# Patient Record
Sex: FEMALE | Race: ASIAN
[De-identification: names, ages, dates, MRNs, and addresses within clinical notes are randomized per-mention and may not be internally consistent; named-entity substitution may affect disease eponyms.]

---
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
## 2018-01-17 ENCOUNTER — APPOINTMENT (OUTPATIENT)
Dept: OBGYN | Facility: CLINIC | Age: 18
End: 2018-01-17
Payer: COMMERCIAL

## 2018-01-17 PROCEDURE — 90471 IMMUNIZATION ADMIN: CPT

## 2018-01-17 PROCEDURE — 99213 OFFICE O/P EST LOW 20 MIN: CPT | Mod: 25

## 2018-01-17 PROCEDURE — 90649 4VHPV VACCINE 3 DOSE IM: CPT

## 2018-10-01 ENCOUNTER — APPOINTMENT (OUTPATIENT)
Dept: OBGYN | Facility: CLINIC | Age: 18
End: 2018-10-01
Payer: COMMERCIAL

## 2018-10-01 PROCEDURE — 99395 PREV VISIT EST AGE 18-39: CPT | Mod: 25

## 2018-10-01 PROCEDURE — 90651 9VHPV VACCINE 2/3 DOSE IM: CPT

## 2018-10-01 PROCEDURE — 90471 IMMUNIZATION ADMIN: CPT

## 2019-01-18 ENCOUNTER — APPOINTMENT (OUTPATIENT)
Dept: OBGYN | Facility: CLINIC | Age: 19
End: 2019-01-18
Payer: COMMERCIAL

## 2019-01-18 PROCEDURE — 99213 OFFICE O/P EST LOW 20 MIN: CPT

## 2019-10-11 ENCOUNTER — APPOINTMENT (OUTPATIENT)
Dept: OBGYN | Facility: CLINIC | Age: 19
End: 2019-10-11

## 2019-10-25 ENCOUNTER — APPOINTMENT (OUTPATIENT)
Dept: OBGYN | Facility: CLINIC | Age: 19
End: 2019-10-25
Payer: COMMERCIAL

## 2019-10-25 PROCEDURE — 99395 PREV VISIT EST AGE 18-39: CPT

## 2020-05-19 ENCOUNTER — FORM ENCOUNTER (OUTPATIENT)
Age: 20
End: 2020-05-19

## 2020-12-18 ENCOUNTER — NON-APPOINTMENT (OUTPATIENT)
Age: 20
End: 2020-12-18

## 2020-12-22 ENCOUNTER — APPOINTMENT (OUTPATIENT)
Dept: OTOLARYNGOLOGY | Facility: CLINIC | Age: 20
End: 2020-12-22
Payer: COMMERCIAL

## 2020-12-22 ENCOUNTER — NON-APPOINTMENT (OUTPATIENT)
Age: 20
End: 2020-12-22

## 2020-12-22 VITALS — TEMPERATURE: 98.7 F | BODY MASS INDEX: 17.07 KG/M2 | WEIGHT: 100 LBS | HEIGHT: 64 IN

## 2020-12-22 PROCEDURE — 99072 ADDL SUPL MATRL&STAF TM PHE: CPT

## 2020-12-22 PROCEDURE — 99203 OFFICE O/P NEW LOW 30 MIN: CPT | Mod: 25

## 2020-12-22 PROCEDURE — 31231 NASAL ENDOSCOPY DX: CPT

## 2020-12-22 RX ORDER — ESCITALOPRAM OXALATE 20 MG/1
TABLET ORAL
Refills: 0 | Status: ACTIVE | COMMUNITY

## 2020-12-22 RX ORDER — SPIRONOLACTONE 25 MG/1
25 TABLET ORAL
Qty: 60 | Refills: 0 | Status: ACTIVE | COMMUNITY
Start: 2020-10-12

## 2020-12-22 NOTE — REVIEW OF SYSTEMS
[Post Nasal Drip] : post nasal drip [Nasal Congestion] : nasal congestion [Nose Bleeds] : nose bleeds [Throat Clearing] : throat clearing [Negative] : Heme/Lymph [Patient Intake Form Reviewed] : Patient intake form was reviewed

## 2020-12-22 NOTE — HISTORY OF PRESENT ILLNESS
[de-identified] : CARINE STANLEY is a 20 year woman with a history of 3 years of recurrent epistaxis. Her last bleed was 10 days ago from the right side. She usually uses ice to stop it.

## 2020-12-22 NOTE — ASSESSMENT
[FreeTextEntry1] : CARINE STANLEY feesl better after cerumen removal. She is using humidifier. I suggest she also use ointment on caudal septum. RTC if continued bleeding for possible cautery.

## 2020-12-22 NOTE — CONSULT LETTER
[Dear  ___] : Dear  [unfilled], [Consult Letter:] : I had the pleasure of evaluating your patient, [unfilled]. [Please see my note below.] : Please see my note below. [Consult Closing:] : Thank you very much for allowing me to participate in the care of this patient.  If you have any questions, please do not hesitate to contact me. [Sincerely,] : Sincerely, [FreeTextEntry3] : Zaki Corado MD\par

## 2021-02-25 ENCOUNTER — APPOINTMENT (OUTPATIENT)
Dept: OTOLARYNGOLOGY | Facility: CLINIC | Age: 21
End: 2021-02-25
Payer: COMMERCIAL

## 2021-02-25 VITALS — TEMPERATURE: 98.7 F | HEIGHT: 64 IN | BODY MASS INDEX: 17.07 KG/M2 | WEIGHT: 100 LBS

## 2021-02-25 PROCEDURE — 30901 CONTROL OF NOSEBLEED: CPT

## 2021-02-25 PROCEDURE — 99072 ADDL SUPL MATRL&STAF TM PHE: CPT

## 2021-02-25 PROCEDURE — 99214 OFFICE O/P EST MOD 30 MIN: CPT | Mod: 25

## 2021-02-25 NOTE — HISTORY OF PRESENT ILLNESS
[de-identified] : CARINE STANLEY is a 21 year woman with a history of epistaxis. She has had several episodes of left epistaxis  yesterday.

## 2021-03-07 ENCOUNTER — FORM ENCOUNTER (OUTPATIENT)
Age: 21
End: 2021-03-07

## 2021-03-08 ENCOUNTER — APPOINTMENT (OUTPATIENT)
Dept: OBGYN | Facility: CLINIC | Age: 21
End: 2021-03-08
Payer: COMMERCIAL

## 2021-03-08 PROCEDURE — 99395 PREV VISIT EST AGE 18-39: CPT

## 2021-03-08 PROCEDURE — 99072 ADDL SUPL MATRL&STAF TM PHE: CPT

## 2021-03-09 ENCOUNTER — FORM ENCOUNTER (OUTPATIENT)
Age: 21
End: 2021-03-09

## 2021-07-15 ENCOUNTER — APPOINTMENT (OUTPATIENT)
Dept: OTOLARYNGOLOGY | Facility: CLINIC | Age: 21
End: 2021-07-15
Payer: COMMERCIAL

## 2021-07-15 VITALS — HEIGHT: 64 IN | BODY MASS INDEX: 17.07 KG/M2 | TEMPERATURE: 96.8 F | WEIGHT: 100 LBS

## 2021-07-15 PROCEDURE — 99214 OFFICE O/P EST MOD 30 MIN: CPT | Mod: 25

## 2021-07-15 PROCEDURE — 69210 REMOVE IMPACTED EAR WAX UNI: CPT

## 2021-07-15 NOTE — HISTORY OF PRESENT ILLNESS
[de-identified] : CARINE STANLEY is a 21 year woman with a history of epistaxis and cerumen impaction. She hasn't had recent bleeding.

## 2021-07-15 NOTE — ASSESSMENT
[FreeTextEntry1] : CARINE LIZETH\par  felt better after cerumen removal. Her septum does not have significant crusting. RTC 6 months.

## 2022-01-28 ENCOUNTER — APPOINTMENT (OUTPATIENT)
Dept: OTOLARYNGOLOGY | Facility: CLINIC | Age: 22
End: 2022-01-28

## 2022-03-03 ENCOUNTER — APPOINTMENT (OUTPATIENT)
Dept: OTOLARYNGOLOGY | Facility: CLINIC | Age: 22
End: 2022-03-03
Payer: COMMERCIAL

## 2022-03-03 VITALS — TEMPERATURE: 97.2 F | BODY MASS INDEX: 17.68 KG/M2 | WEIGHT: 110 LBS | HEIGHT: 66 IN

## 2022-03-03 PROCEDURE — 69210 REMOVE IMPACTED EAR WAX UNI: CPT

## 2022-03-03 PROCEDURE — 99213 OFFICE O/P EST LOW 20 MIN: CPT | Mod: 25

## 2022-03-03 NOTE — ASSESSMENT
[FreeTextEntry1] : CARINE STANLEY felt better after cerumen removal. she will use ointment tin her nose.

## 2022-03-03 NOTE — HISTORY OF PRESENT ILLNESS
[de-identified] : CARINE STANLEY is a 22 year woman with a history of cerumen impaction and epistaxis. The epistaxis seems to be related to Accutane. She recently stopped it and will try it again at a lower dose.

## 2022-03-17 ENCOUNTER — RX RENEWAL (OUTPATIENT)
Age: 22
End: 2022-03-17

## 2022-03-31 ENCOUNTER — NON-APPOINTMENT (OUTPATIENT)
Age: 22
End: 2022-03-31

## 2022-04-21 ENCOUNTER — APPOINTMENT (OUTPATIENT)
Dept: OBGYN | Facility: CLINIC | Age: 22
End: 2022-04-21
Payer: COMMERCIAL

## 2022-04-21 VITALS
RESPIRATION RATE: 15 BRPM | HEART RATE: 78 BPM | SYSTOLIC BLOOD PRESSURE: 109 MMHG | WEIGHT: 110 LBS | DIASTOLIC BLOOD PRESSURE: 70 MMHG | OXYGEN SATURATION: 98 % | HEIGHT: 66 IN | BODY MASS INDEX: 17.68 KG/M2

## 2022-04-21 DIAGNOSIS — Z01.419 ENCOUNTER FOR GYNECOLOGICAL EXAMINATION (GENERAL) (ROUTINE) W/OUT ABNORMAL FINDINGS: ICD-10-CM

## 2022-04-21 DIAGNOSIS — Z30.41 ENCOUNTER FOR SURVEILLANCE OF CONTRACEPTIVE PILLS: ICD-10-CM

## 2022-04-21 PROCEDURE — 99395 PREV VISIT EST AGE 18-39: CPT

## 2022-04-21 NOTE — HISTORY OF PRESENT ILLNESS
[TextBox_4] : 21yo G0 presents for routine gyn exam. Not yet sexually active.  does not use tampons.  Declines sti screen. Normal period/cycle on luis.  No complaints. \par Working in computers.\par \par Info. from prior EMR:\par Demographics:  Race:  Ethnicity: Not  or  Native Language: English Occupation: Student \par : 0  Para:  0 0 0 0\par OB History: No significant OB history. \par \par GYN History: No significant GYN history. Single \par PMH\par  No significant past medical history. \par Surgical History: Strabismus \par Allergies: nkda\par Current Medications: Prescribed/Suppliments/OTC OCP, ORAL MED FOR SKIN \par Medications Verified Medications Verified \par Fam HX comments: pt adopted \par \par DVT \par  Personal history of blood clots/DVT/PE: no  \par  Personal history of conditions causing increased risk of blood clots/DVT/PE: no  \par

## 2022-04-21 NOTE — PLAN
[FreeTextEntry1] : HCM\par -SBE\par -virginal - not able to tolerate speculum/bimanual exam\par -refilled ocp\par \par RTO 1 year\par

## 2022-04-21 NOTE — PHYSICAL EXAM
[Chaperone Declined] : Patient declined chaperone [Appropriately responsive] : appropriately responsive [Alert] : alert [No Acute Distress] : no acute distress [No Lymphadenopathy] : no lymphadenopathy [Soft] : soft [Non-tender] : non-tender [Non-distended] : non-distended [No Lesions] : no lesions [No Mass] : no mass [Oriented x3] : oriented x3 [Examination Of The Breasts] : a normal appearance [No Masses] : no breast masses were palpable [Labia Majora] : normal [Labia Minora] : normal [Normal] : normal [FreeTextEntry5] : Resp. rate wnl, color pink, no SOB [FreeTextEntry4] : pt. did not tollerate speculum or pelvic exam [FreeTextEntry8] : Pt. not able to tolerate exam, virginal

## 2022-05-05 ENCOUNTER — NON-APPOINTMENT (OUTPATIENT)
Age: 22
End: 2022-05-05

## 2022-05-12 ENCOUNTER — APPOINTMENT (OUTPATIENT)
Dept: OTOLARYNGOLOGY | Facility: CLINIC | Age: 22
End: 2022-05-12
Payer: COMMERCIAL

## 2022-05-12 VITALS — TEMPERATURE: 97.8 F | HEIGHT: 66 IN | WEIGHT: 110 LBS | BODY MASS INDEX: 17.68 KG/M2

## 2022-05-12 PROCEDURE — 99213 OFFICE O/P EST LOW 20 MIN: CPT | Mod: 25

## 2022-05-12 PROCEDURE — 69210 REMOVE IMPACTED EAR WAX UNI: CPT

## 2022-05-12 NOTE — REASON FOR VISIT
[Subsequent Evaluation] : a subsequent evaluation for [FreeTextEntry2] : left ear vibration feeling

## 2022-05-12 NOTE — HISTORY OF PRESENT ILLNESS
[de-identified] : CARINE STANLEY is a 22 year woman with a history of cerumen impaction complaining of several days of left ear discomfort/vibration. She felt it for a few days and it has resolved.

## 2022-05-12 NOTE — ASSESSMENT
[FreeTextEntry1] : CARINE STANLEY had several days of intermittent vibration AS. Now resolved. ? ME muscle spasm. RTC 3-4 months.

## 2022-05-24 ENCOUNTER — NON-APPOINTMENT (OUTPATIENT)
Age: 22
End: 2022-05-24

## 2022-09-04 ENCOUNTER — NON-APPOINTMENT (OUTPATIENT)
Age: 22
End: 2022-09-04

## 2022-09-08 ENCOUNTER — APPOINTMENT (OUTPATIENT)
Dept: OTOLARYNGOLOGY | Facility: CLINIC | Age: 22
End: 2022-09-08

## 2022-09-09 ENCOUNTER — APPOINTMENT (OUTPATIENT)
Dept: OTOLARYNGOLOGY | Facility: CLINIC | Age: 22
End: 2022-09-09

## 2022-09-09 VITALS — TEMPERATURE: 97.4 F | BODY MASS INDEX: 17.68 KG/M2 | WEIGHT: 110 LBS | HEIGHT: 66 IN

## 2022-09-09 PROCEDURE — 69210 REMOVE IMPACTED EAR WAX UNI: CPT

## 2022-09-09 PROCEDURE — 99213 OFFICE O/P EST LOW 20 MIN: CPT | Mod: 25

## 2022-09-09 PROCEDURE — 31231 NASAL ENDOSCOPY DX: CPT

## 2022-09-09 NOTE — HISTORY OF PRESENT ILLNESS
[de-identified] : CARINE STANLEY is a 22 year woman with a history of cerumen impaction and epistaxis. She had 2  right sided nosebleeds about one week ago one after the other. She has had no bleeding since.

## 2022-09-09 NOTE — ASSESSMENT
[FreeTextEntry1] : CARINE STANLEY had right anterior nosebleed last one week ago. I suggested applying ointment to caudal septum. I showed her how to pack her nose if it bleeds. If so she will come in. RTC 6 months.

## 2023-01-15 ENCOUNTER — NON-APPOINTMENT (OUTPATIENT)
Age: 23
End: 2023-01-15

## 2023-02-03 ENCOUNTER — NON-APPOINTMENT (OUTPATIENT)
Age: 23
End: 2023-02-03

## 2023-03-07 ENCOUNTER — APPOINTMENT (OUTPATIENT)
Dept: OTOLARYNGOLOGY | Facility: CLINIC | Age: 23
End: 2023-03-07
Payer: COMMERCIAL

## 2023-03-07 VITALS — BODY MASS INDEX: 17.68 KG/M2 | HEIGHT: 66 IN | WEIGHT: 110 LBS

## 2023-03-07 PROCEDURE — 69210 REMOVE IMPACTED EAR WAX UNI: CPT

## 2023-03-07 PROCEDURE — 99213 OFFICE O/P EST LOW 20 MIN: CPT | Mod: 25

## 2023-03-07 RX ORDER — DROSPIRENONE AND ETHINYL ESTRADIOL 0.02-3(28)
3-0.02 KIT ORAL DAILY
Qty: 1 | Refills: 0 | Status: COMPLETED | COMMUNITY
Start: 2022-03-17 | End: 2023-03-07

## 2023-03-07 RX ORDER — SPIRONOLACTONE 50 MG/1
TABLET ORAL
Refills: 0 | Status: COMPLETED | COMMUNITY
End: 2023-03-07

## 2023-03-07 RX ORDER — ESCITALOPRAM OXALATE 5 MG/1
5 TABLET ORAL
Qty: 90 | Refills: 0 | Status: COMPLETED | COMMUNITY
Start: 2020-10-24 | End: 2023-03-07

## 2023-03-07 RX ORDER — LEVONORGESTREL 0.75 MG/1
0.75 TABLET ORAL
Refills: 0 | Status: COMPLETED | COMMUNITY
End: 2023-03-07

## 2023-03-07 NOTE — HISTORY OF PRESENT ILLNESS
[de-identified] : CARINE STANLEY is a 23 year woman with a history of cerumen impaction. Her ears are clogged.

## 2023-03-21 ENCOUNTER — APPOINTMENT (OUTPATIENT)
Dept: OPHTHALMOLOGY | Facility: CLINIC | Age: 23
End: 2023-03-21
Payer: COMMERCIAL

## 2023-03-21 ENCOUNTER — NON-APPOINTMENT (OUTPATIENT)
Age: 23
End: 2023-03-21

## 2023-03-21 PROCEDURE — 92250 FUNDUS PHOTOGRAPHY W/I&R: CPT

## 2023-03-21 PROCEDURE — 92004 COMPRE OPH EXAM NEW PT 1/>: CPT

## 2023-05-10 ENCOUNTER — APPOINTMENT (OUTPATIENT)
Dept: OBGYN | Facility: CLINIC | Age: 23
End: 2023-05-10

## 2023-07-25 ENCOUNTER — APPOINTMENT (OUTPATIENT)
Dept: OPHTHALMOLOGY | Facility: CLINIC | Age: 23
End: 2023-07-25

## 2023-08-29 ENCOUNTER — APPOINTMENT (OUTPATIENT)
Dept: OBGYN | Facility: CLINIC | Age: 23
End: 2023-08-29
Payer: COMMERCIAL

## 2023-08-29 VITALS
SYSTOLIC BLOOD PRESSURE: 97 MMHG | WEIGHT: 110 LBS | DIASTOLIC BLOOD PRESSURE: 61 MMHG | HEIGHT: 66 IN | BODY MASS INDEX: 17.68 KG/M2

## 2023-08-29 PROCEDURE — 99395 PREV VISIT EST AGE 18-39: CPT

## 2023-08-29 NOTE — HISTORY OF PRESENT ILLNESS
[FreeTextEntry1] : 23 year old female presents for an annual. Pt is doing well with no complaints. She is currently on Justina. Not sexually active.  SHx: In online Graduate school.

## 2023-08-29 NOTE — END OF VISIT
[FreeTextEntry3] : I, Rylie Gonzalez, acted as a scribe on behalf of Dr. Abi Randall D.O. on 08/29/2023.  All medical entries made by the scribe were at my, Dr. Abi Randall D.O, direction and personally dictated by me on 08/29/2023. I have reviewed the chart and agree that the record accurately reflects my personal performance of the history, physical exam, assessment and plan. I have also personally directed, reviewed, and agreed with the chart.

## 2023-09-12 ENCOUNTER — APPOINTMENT (OUTPATIENT)
Dept: OTOLARYNGOLOGY | Facility: CLINIC | Age: 23
End: 2023-09-12
Payer: COMMERCIAL

## 2023-09-12 VITALS — WEIGHT: 110 LBS | BODY MASS INDEX: 17.68 KG/M2 | HEIGHT: 66 IN

## 2023-09-12 DIAGNOSIS — H92.02 OTALGIA, LEFT EAR: ICD-10-CM

## 2023-09-12 PROCEDURE — 69210 REMOVE IMPACTED EAR WAX UNI: CPT | Mod: LT

## 2023-09-12 PROCEDURE — 99213 OFFICE O/P EST LOW 20 MIN: CPT | Mod: 25

## 2024-02-12 RX ORDER — DROSPIRENONE AND ETHINYL ESTRADIOL 0.02-3(28)
3-0.02 KIT ORAL
Qty: 3 | Refills: 2 | Status: ACTIVE | COMMUNITY
Start: 2022-04-21 | End: 1900-01-01

## 2024-03-15 ENCOUNTER — APPOINTMENT (OUTPATIENT)
Dept: OTOLARYNGOLOGY | Facility: CLINIC | Age: 24
End: 2024-03-15
Payer: COMMERCIAL

## 2024-03-15 DIAGNOSIS — H61.23 IMPACTED CERUMEN, BILATERAL: ICD-10-CM

## 2024-03-15 DIAGNOSIS — R04.0 EPISTAXIS: ICD-10-CM

## 2024-03-15 PROCEDURE — 31231 NASAL ENDOSCOPY DX: CPT

## 2024-03-15 PROCEDURE — 99213 OFFICE O/P EST LOW 20 MIN: CPT | Mod: 25

## 2024-03-15 RX ORDER — SODIUM FLUORIDE AND POTASSIUM NITRATE 5.8; 57.5 MG/ML; MG/ML
1.1-5 GEL, DENTIFRICE DENTAL
Qty: 100 | Refills: 0 | Status: DISCONTINUED | COMMUNITY
Start: 2020-11-25 | End: 2024-03-15

## 2024-03-15 RX ORDER — LEVONORGESTREL AND ETHINYL ESTRADIOL 0.1-0.02MG
0.1-2 KIT ORAL
Qty: 84 | Refills: 0 | Status: DISCONTINUED | COMMUNITY
Start: 2020-05-20 | End: 2024-03-15

## 2024-03-15 RX ORDER — TRETINOIN 1 MG/G
0.1 CREAM TOPICAL
Qty: 135 | Refills: 0 | Status: DISCONTINUED | COMMUNITY
Start: 2020-10-12 | End: 2024-03-15

## 2024-03-15 RX ORDER — CHLORHEXIDINE GLUCONATE, 0.12% ORAL RINSE 1.2 MG/ML
0.12 SOLUTION DENTAL
Qty: 473 | Refills: 0 | Status: DISCONTINUED | COMMUNITY
Start: 2020-08-11 | End: 2024-03-15

## 2024-03-15 NOTE — HISTORY OF PRESENT ILLNESS
[de-identified] : CARINE STANLEY  is a 24 year woman with a history of cerumen impaction. She had 2 episodes of left epitaxies over 2 weeks. last episode was several days ago.

## 2024-03-15 NOTE — PHYSICAL EXAM
[TextEntry] : PHYSICAL EXAM  General: The patient was alert and oriented and in no distress. Voice was normal.  Neurologic: Cranial Nerves II-XII intact PERRLA There was no significant nystagmus or disconjugate gaze noted.  EOM's: Normal  Face: The patient had no facial asymmetry or mass. The skin was unremarkable.  Ears: External ears were normal without deformity. Ear canals were normal. Tympanic membranes were intact and normal. No perforation or effusion I removed impacted cerumen from both ears under the microscope with curet, forceps   Nose:  The external nose had no significant deformity.  There was no facial tenderness.  On anterior rhinoscopy, the nasal mucosa was normal.  The anterior septum was normal. There were no visualized polyps purulence  or masses.  Oral cavity: The oral mucosa was normal. The oral and base of tongue were clear and without mass. The gingival and buccal mucosa were moist and without lesions. The palate moved well. There was no cleft palate. There appeared to be good salivary flow.   There was no pus, erythema or mass in the oral cavity/oropharynx.  Neck:  The neck was symmetrical. The parotid and submandibular glands were normal without masses. The trachea was midline and there was no unusual crepitus. Thyroid was smooth and nontender and no masses were palpated. Cervical adenopathy- none.  Procedure: Nasal Endoscopy  Diagnosis: Chronic sinusitis  Dr. Zaki Corado  Anesthesia: none Procedure: The fiberoptic endoscope was introduced into the right nostril and passed along the floor of the nose and then  along the middle meatus. The same procedure was carried out  on the left side.  Findings:   Septum: mildly deviated bilaterally        crusting on both sides of septum. Right:    Middle Turbinate: normal Middle meatus congested no polyps or pus  Superior meatus:normal SER:normal Inferior Turbinate: normal  Left:      Middle Turbinate: normal Middle meatus congested no polyps or pus Superior meatus:normal SER:normal Inferior Turbinate: normal

## 2024-05-30 ENCOUNTER — APPOINTMENT (OUTPATIENT)
Dept: OBGYN | Facility: CLINIC | Age: 24
End: 2024-05-30
Payer: COMMERCIAL

## 2024-05-30 VITALS
SYSTOLIC BLOOD PRESSURE: 94 MMHG | HEIGHT: 66 IN | WEIGHT: 121 LBS | DIASTOLIC BLOOD PRESSURE: 60 MMHG | BODY MASS INDEX: 19.44 KG/M2 | HEART RATE: 71 BPM

## 2024-05-30 DIAGNOSIS — N94.6 DYSMENORRHEA, UNSPECIFIED: ICD-10-CM

## 2024-05-30 PROCEDURE — 99213 OFFICE O/P EST LOW 20 MIN: CPT

## 2024-05-30 RX ORDER — NORETHINDRONE ACETATE AND ETHINYL ESTRADIOL AND FERROUS FUMARATE 1MG-20(21)
1-20 KIT ORAL
Qty: 3 | Refills: 1 | Status: ACTIVE | COMMUNITY
Start: 2024-05-30 | End: 1900-01-01

## 2024-05-30 RX ORDER — NAPROXEN SODIUM 550 MG/1
550 TABLET ORAL EVERY 8 HOURS
Qty: 90 | Refills: 1 | Status: ACTIVE | COMMUNITY
Start: 2024-05-30 | End: 1900-01-01

## 2024-05-30 NOTE — END OF VISIT
[FreeTextEntry3] : I, Rylie Gonzalez, acted as a scribe on behalf of Dr. Abi Randall D.O. on 05/30/2024.  All medical entries made by the scribe were at my, Dr. Abi Randall D.O, direction and personally dictated by me on 05/30/2024. I have reviewed the chart and agree that the record accurately reflects my personal performance of the history, physical exam, assessment and plan. I have also personally directed, reviewed, and agreed with the chart.

## 2024-05-30 NOTE — PLAN
[FreeTextEntry1] : 24 year old female presents to discuss BC options  -Rx Junel -Rx Anaprox DS  RTO for annual

## 2024-05-30 NOTE — HISTORY OF PRESENT ILLNESS
[FreeTextEntry1] : 24 year old female presents to discuss BC options. C/o of still having painful periods despite being on Justina.

## 2024-08-08 ENCOUNTER — APPOINTMENT (OUTPATIENT)
Dept: OTOLARYNGOLOGY | Facility: CLINIC | Age: 24
End: 2024-08-08

## 2024-08-08 PROCEDURE — 31575 DIAGNOSTIC LARYNGOSCOPY: CPT

## 2024-08-08 PROCEDURE — 69210 REMOVE IMPACTED EAR WAX UNI: CPT | Mod: 59

## 2024-08-08 PROCEDURE — 99213 OFFICE O/P EST LOW 20 MIN: CPT | Mod: 25

## 2024-08-08 NOTE — REASON FOR VISIT
[Subsequent Evaluation] : a subsequent evaluation for [FreeTextEntry2] : impacted cerumen and cough

## 2024-09-05 ENCOUNTER — APPOINTMENT (OUTPATIENT)
Dept: OTOLARYNGOLOGY | Facility: CLINIC | Age: 24
End: 2024-09-05
Payer: COMMERCIAL

## 2024-09-05 VITALS — WEIGHT: 121 LBS | HEIGHT: 66 IN | BODY MASS INDEX: 19.44 KG/M2

## 2024-09-05 DIAGNOSIS — K21.9 GASTRO-ESOPHAGEAL REFLUX DISEASE W/OUT ESOPHAGITIS: ICD-10-CM

## 2024-09-05 DIAGNOSIS — R05.9 COUGH, UNSPECIFIED: ICD-10-CM

## 2024-09-05 PROCEDURE — 99213 OFFICE O/P EST LOW 20 MIN: CPT | Mod: 25

## 2024-09-05 PROCEDURE — 31575 DIAGNOSTIC LARYNGOSCOPY: CPT

## 2024-09-05 RX ORDER — FAMOTIDINE 20 MG/1
20 TABLET, FILM COATED ORAL
Qty: 30 | Refills: 0 | Status: ACTIVE | COMMUNITY
Start: 2024-09-05 | End: 1900-01-01

## 2024-09-05 NOTE — ASSESSMENT
[FreeTextEntry1] : CARINE STANLEY is feeling much better re cough. She will reduce Famotidine to 20mg for 2-3 weeks.

## 2024-09-05 NOTE — HISTORY OF PRESENT ILLNESS
[de-identified] :  CARINE STANLEY is a 24 year woman with a history of recent URI followed by cough from throat. She antibiotic, prednisone and CXR normal) She has been on LPR regimen Famotine 40mg, reflux diet and alkaline water. She feels back to normal without cough.

## 2024-09-05 NOTE — PHYSICAL EXAM
[TextEntry] : PHYSICAL EXAM  General: The patient was alert and oriented and in no distress. Voice was normal.    EOM's: Normal  Face: The patient had no facial asymmetry or mass. The skin was unremarkable.  Ears: External ears were normal without deformity. Ear canals were normal. Tympanic membranes were intact and normal. No perforation or effusion  Nose:  The external nose had no significant deformity.  There was no facial tenderness.  On anterior rhinoscopy, the nasal mucosa was normal.  The anterior septum was normal. There were no visualized polyps purulence  or masses.  Oral cavity: The oral mucosa was normal. The oral and base of tongue were clear and without mass. The gingival and buccal mucosa were moist and without lesions. The palate moved well. There was no cleft palate. There appeared to be good salivary flow.   There was no pus, erythema or mass in the oral cavity/oropharynx.  Neck:  The neck was symmetrical. The parotid and submandibular glands were normal without masses. The trachea was midline and there was no unusual crepitus. Thyroid was smooth and nontender and no masses were palpated. Cervical adenopathy- none.  Procedure: Fiberoptic Nasolaryngoscopy Dx: Dysphagia, LPRD, Hoarseness Dr. Zaki Corado Anesthetic: Lidocaine and oxymetazoline topical   Findings: The flexible scope was easily passed via the nose. The larynx was grossly normal. The epiglottis was normal. The hypopharynx and base of tongue were normal. The vallecula was normal. The vocal folds were grossly normal and moved normally. There are  mild changes of laryngopharyngeal reflux  (LPR)  manifest by mild ventricular swelling,  posterior commissure thickening.   No lesions were noted.

## 2025-03-04 ENCOUNTER — NON-APPOINTMENT (OUTPATIENT)
Age: 25
End: 2025-03-04

## 2025-03-04 ENCOUNTER — APPOINTMENT (OUTPATIENT)
Dept: OTOLARYNGOLOGY | Facility: CLINIC | Age: 25
End: 2025-03-04
Payer: COMMERCIAL

## 2025-03-04 VITALS — BODY MASS INDEX: 18.48 KG/M2 | HEIGHT: 66 IN | WEIGHT: 115 LBS

## 2025-03-04 DIAGNOSIS — H61.23 IMPACTED CERUMEN, BILATERAL: ICD-10-CM

## 2025-03-04 DIAGNOSIS — K21.9 GASTRO-ESOPHAGEAL REFLUX DISEASE W/OUT ESOPHAGITIS: ICD-10-CM

## 2025-03-04 DIAGNOSIS — H60.543 ACUTE ECZEMATOID OTITIS EXTERNA, BILATERAL: ICD-10-CM

## 2025-03-04 PROCEDURE — 99213 OFFICE O/P EST LOW 20 MIN: CPT | Mod: 25

## 2025-03-04 PROCEDURE — 69210 REMOVE IMPACTED EAR WAX UNI: CPT

## 2025-07-11 ENCOUNTER — APPOINTMENT (OUTPATIENT)
Dept: OBGYN | Facility: CLINIC | Age: 25
End: 2025-07-11
Payer: COMMERCIAL

## 2025-07-11 VITALS
WEIGHT: 115 LBS | DIASTOLIC BLOOD PRESSURE: 67 MMHG | HEART RATE: 73 BPM | HEIGHT: 66 IN | BODY MASS INDEX: 18.48 KG/M2 | SYSTOLIC BLOOD PRESSURE: 99 MMHG

## 2025-07-11 PROBLEM — N92.6 IRREGULAR MENSTRUAL BLEEDING: Status: ACTIVE | Noted: 2025-07-11

## 2025-07-11 PROCEDURE — 99213 OFFICE O/P EST LOW 20 MIN: CPT

## 2025-07-11 RX ORDER — LEVONORGESTREL AND ETHINYL ESTRADIOL 0.1-0.02MG
0.1-2 KIT ORAL DAILY
Qty: 3 | Refills: 2 | Status: ACTIVE | COMMUNITY
Start: 2025-07-11 | End: 1900-01-01

## 2025-09-03 ENCOUNTER — TRANSCRIPTION ENCOUNTER (OUTPATIENT)
Age: 25
End: 2025-09-03

## 2025-09-04 ENCOUNTER — TRANSCRIPTION ENCOUNTER (OUTPATIENT)
Age: 25
End: 2025-09-04

## 2025-09-09 ENCOUNTER — APPOINTMENT (OUTPATIENT)
Dept: OTOLARYNGOLOGY | Facility: CLINIC | Age: 25
End: 2025-09-09
Payer: COMMERCIAL

## 2025-09-09 DIAGNOSIS — H60.543 ACUTE ECZEMATOID OTITIS EXTERNA, BILATERAL: ICD-10-CM

## 2025-09-09 DIAGNOSIS — J30.2 OTHER SEASONAL ALLERGIC RHINITIS: ICD-10-CM

## 2025-09-09 DIAGNOSIS — H61.23 IMPACTED CERUMEN, BILATERAL: ICD-10-CM

## 2025-09-09 DIAGNOSIS — K21.9 GASTRO-ESOPHAGEAL REFLUX DISEASE W/OUT ESOPHAGITIS: ICD-10-CM

## 2025-09-09 PROCEDURE — 99213 OFFICE O/P EST LOW 20 MIN: CPT | Mod: 25

## 2025-09-09 PROCEDURE — 69210 REMOVE IMPACTED EAR WAX UNI: CPT | Mod: LT,59
